# Patient Record
Sex: FEMALE | Race: ASIAN | URBAN - METROPOLITAN AREA
[De-identification: names, ages, dates, MRNs, and addresses within clinical notes are randomized per-mention and may not be internally consistent; named-entity substitution may affect disease eponyms.]

---

## 2022-10-17 ENCOUNTER — OFFICE VISIT (OUTPATIENT)
Dept: FAMILY MEDICINE CLINIC | Age: 47
End: 2022-10-17
Payer: COMMERCIAL

## 2022-10-17 VITALS
SYSTOLIC BLOOD PRESSURE: 135 MMHG | BODY MASS INDEX: 27.08 KG/M2 | DIASTOLIC BLOOD PRESSURE: 81 MMHG | OXYGEN SATURATION: 100 % | RESPIRATION RATE: 20 BRPM | HEIGHT: 61 IN | HEART RATE: 94 BPM | WEIGHT: 143.4 LBS

## 2022-10-17 DIAGNOSIS — E78.00 HYPERCHOLESTEROLEMIA: ICD-10-CM

## 2022-10-17 DIAGNOSIS — Z00.00 WELL WOMAN EXAM (NO GYNECOLOGICAL EXAM): Primary | ICD-10-CM

## 2022-10-17 DIAGNOSIS — E11.8 TYPE II DIABETES MELLITUS WITH COMPLICATION (HCC): ICD-10-CM

## 2022-10-17 DIAGNOSIS — I10 PRIMARY HYPERTENSION: ICD-10-CM

## 2022-10-17 DIAGNOSIS — Z23 ENCOUNTER FOR IMMUNIZATION: ICD-10-CM

## 2022-10-17 DIAGNOSIS — Z12.31 ENCOUNTER FOR SCREENING MAMMOGRAM FOR BREAST CANCER: ICD-10-CM

## 2022-10-17 PROBLEM — E11.9 TYPE 2 DIABETES MELLITUS (HCC): Status: ACTIVE | Noted: 2020-01-02

## 2022-10-17 LAB
ALBUMIN SERPL-MCNC: 4.2 G/DL (ref 3.5–5)
ALBUMIN/GLOB SERPL: 1.1 {RATIO} (ref 1.1–2.2)
ALP SERPL-CCNC: 98 U/L (ref 45–117)
ALT SERPL-CCNC: 41 U/L (ref 12–78)
ANION GAP SERPL CALC-SCNC: 7 MMOL/L (ref 5–15)
APPEARANCE UR: CLEAR
AST SERPL-CCNC: 21 U/L (ref 15–37)
BILIRUB SERPL-MCNC: 0.5 MG/DL (ref 0.2–1)
BILIRUB UR QL: NEGATIVE
BUN SERPL-MCNC: 6 MG/DL (ref 6–20)
BUN/CREAT SERPL: 9 (ref 12–20)
CALCIUM SERPL-MCNC: 9.5 MG/DL (ref 8.5–10.1)
CHLORIDE SERPL-SCNC: 107 MMOL/L (ref 97–108)
CHOLEST SERPL-MCNC: 162 MG/DL
CO2 SERPL-SCNC: 27 MMOL/L (ref 21–32)
COLOR UR: ABNORMAL
CREAT SERPL-MCNC: 0.7 MG/DL (ref 0.55–1.02)
CREAT UR-MCNC: 14.3 MG/DL
ERYTHROCYTE [DISTWIDTH] IN BLOOD BY AUTOMATED COUNT: 15 % (ref 11.5–14.5)
EST. AVERAGE GLUCOSE BLD GHB EST-MCNC: 192 MG/DL
FERRITIN SERPL-MCNC: 5 NG/ML (ref 26–388)
GLOBULIN SER CALC-MCNC: 4 G/DL (ref 2–4)
GLUCOSE SERPL-MCNC: 191 MG/DL (ref 65–100)
GLUCOSE UR STRIP.AUTO-MCNC: 100 MG/DL
HBA1C MFR BLD: 8.3 % (ref 4–5.6)
HCT VFR BLD AUTO: 41.1 % (ref 35–47)
HDLC SERPL-MCNC: 53 MG/DL
HDLC SERPL: 3.1 {RATIO} (ref 0–5)
HGB BLD-MCNC: 12.6 G/DL (ref 11.5–16)
HGB UR QL STRIP: NEGATIVE
KETONES UR QL STRIP.AUTO: NEGATIVE MG/DL
LDLC SERPL CALC-MCNC: 88.2 MG/DL (ref 0–100)
LEUKOCYTE ESTERASE UR QL STRIP.AUTO: NEGATIVE
MCH RBC QN AUTO: 26.3 PG (ref 26–34)
MCHC RBC AUTO-ENTMCNC: 30.7 G/DL (ref 30–36.5)
MCV RBC AUTO: 85.8 FL (ref 80–99)
MICROALBUMIN UR-MCNC: <0.5 MG/DL
MICROALBUMIN/CREAT UR-RTO: <35 MG/G (ref 0–30)
NITRITE UR QL STRIP.AUTO: NEGATIVE
NRBC # BLD: 0 K/UL (ref 0–0.01)
NRBC BLD-RTO: 0 PER 100 WBC
PH UR STRIP: 6.5 [PH] (ref 5–8)
PLATELET # BLD AUTO: 455 K/UL (ref 150–400)
PMV BLD AUTO: 10.4 FL (ref 8.9–12.9)
POTASSIUM SERPL-SCNC: 4 MMOL/L (ref 3.5–5.1)
PROT SERPL-MCNC: 8.2 G/DL (ref 6.4–8.2)
PROT UR STRIP-MCNC: NEGATIVE MG/DL
RBC # BLD AUTO: 4.79 M/UL (ref 3.8–5.2)
SODIUM SERPL-SCNC: 141 MMOL/L (ref 136–145)
SP GR UR REFRACTOMETRY: 1 (ref 1–1.03)
T4 FREE SERPL-MCNC: 1.5 NG/DL (ref 0.8–1.5)
TRIGL SERPL-MCNC: 104 MG/DL (ref ?–150)
TSH SERPL DL<=0.05 MIU/L-ACNC: 1.29 UIU/ML (ref 0.36–3.74)
UROBILINOGEN UR QL STRIP.AUTO: 0.2 EU/DL (ref 0.2–1)
VLDLC SERPL CALC-MCNC: 20.8 MG/DL
WBC # BLD AUTO: 7.4 K/UL (ref 3.6–11)

## 2022-10-17 PROCEDURE — 90471 IMMUNIZATION ADMIN: CPT | Performed by: FAMILY MEDICINE

## 2022-10-17 PROCEDURE — 99386 PREV VISIT NEW AGE 40-64: CPT | Performed by: FAMILY MEDICINE

## 2022-10-17 PROCEDURE — 90686 IIV4 VACC NO PRSV 0.5 ML IM: CPT | Performed by: FAMILY MEDICINE

## 2022-10-17 RX ORDER — ATORVASTATIN CALCIUM 10 MG/1
10 TABLET, FILM COATED ORAL DAILY
COMMUNITY

## 2022-10-17 RX ORDER — AMLODIPINE AND BENAZEPRIL HYDROCHLORIDE 5; 40 MG/1; MG/1
1 CAPSULE ORAL DAILY
COMMUNITY

## 2022-10-17 RX ORDER — METFORMIN HYDROCHLORIDE 1000 MG/1
TABLET, FILM COATED, EXTENDED RELEASE ORAL
COMMUNITY

## 2022-10-17 NOTE — LETTER
10/23/2022    Ms. Kaveh Gomez  [address] [id number]      Dear Kaveh Gomez:    Please find your most recent results below.         Recent Results (from the past 672 hour(s))   MICROALBUMIN, UR, RAND W/ MICROALB/CREAT RATIO    Collection Time: 10/17/22  9:03 AM   Result Value Ref Range    Microalbumin,urine random <0.50 MG/DL    Creatinine, urine random 14.30 mg/dL    Microalbumin/Creat ratio (mg/g creat) <35 (H) 0 - 30 mg/g   HEMOGLOBIN A1C WITH EAG    Collection Time: 10/17/22  9:03 AM   Result Value Ref Range    Hemoglobin A1c 8.3 (H) 4.0 - 5.6 %    Est. average glucose 192 mg/dL   FERRITIN    Collection Time: 10/17/22  9:03 AM   Result Value Ref Range    Ferritin 5 (L) 26 - 388 NG/ML   T4, FREE    Collection Time: 10/17/22  9:03 AM   Result Value Ref Range    T4, Free 1.5 0.8 - 1.5 NG/DL   TSH 3RD GENERATION    Collection Time: 10/17/22  9:03 AM   Result Value Ref Range    TSH 1.29 0.36 - 3.74 uIU/mL   URINALYSIS W/ RFLX MICROSCOPIC    Collection Time: 10/17/22  9:03 AM   Result Value Ref Range    Color YELLOW/STRAW      Appearance CLEAR CLEAR      Specific gravity 1.005 1.003 - 1.030      pH (UA) 6.5 5.0 - 8.0      Protein Negative Negative mg/dL    Glucose 100 (A) Negative mg/dL    Ketone Negative Negative mg/dL    Bilirubin Negative Negative      Blood Negative Negative      Urobilinogen 0.2 0.2 - 1.0 EU/dL    Nitrites Negative Negative      Leukocyte Esterase Negative Negative     METABOLIC PANEL, COMPREHENSIVE    Collection Time: 10/17/22  9:03 AM   Result Value Ref Range    Sodium 141 136 - 145 mmol/L    Potassium 4.0 3.5 - 5.1 mmol/L    Chloride 107 97 - 108 mmol/L    CO2 27 21 - 32 mmol/L    Anion gap 7 5 - 15 mmol/L    Glucose 191 (H) 65 - 100 mg/dL    BUN 6 6 - 20 MG/DL    Creatinine 0.70 0.55 - 1.02 MG/DL    BUN/Creatinine ratio 9 (L) 12 - 20      eGFR >60 >60 ml/min/1.73m2    Calcium 9.5 8.5 - 10.1 MG/DL    Bilirubin, total 0.5 0.2 - 1.0 MG/DL    ALT (SGPT) 41 12 - 78 U/L AST (SGOT) 21 15 - 37 U/L    Alk. phosphatase 98 45 - 117 U/L    Protein, total 8.2 6.4 - 8.2 g/dL    Albumin 4.2 3.5 - 5.0 g/dL    Globulin 4.0 2.0 - 4.0 g/dL    A-G Ratio 1.1 1.1 - 2.2     LIPID PANEL    Collection Time: 10/17/22  9:03 AM   Result Value Ref Range    Cholesterol, total 162 <200 MG/DL    Triglyceride 104 <150 MG/DL    HDL Cholesterol 53 MG/DL    LDL, calculated 88.2 0 - 100 MG/DL    VLDL, calculated 20.8 MG/DL    CHOL/HDL Ratio 3.1 0.0 - 5.0     CBC W/O DIFF    Collection Time: 10/17/22  9:03 AM   Result Value Ref Range    WBC 7.4 3.6 - 11.0 K/uL    RBC 4.79 3.80 - 5.20 M/uL    HGB 12.6 11.5 - 16.0 g/dL    HCT 41.1 35.0 - 47.0 %    MCV 85.8 80.0 - 99.0 FL    MCH 26.3 26.0 - 34.0 PG    MCHC 30.7 30.0 - 36.5 g/dL    RDW 15.0 (H) 11.5 - 14.5 %    PLATELET 487 (H) 965 - 400 K/uL    MPV 10.4 8.9 - 12.9 FL    NRBC 0.0 0  WBC    ABSOLUTE NRBC 0.00 0.00 - 0.01 K/uL   RECOMMENDATIONS:  1. Normal test results except for low blood count  Please have over the counter iron tablets 2 to 3 times daily,  an iron rich diet, vitamin c rich diet and vitamin-C 500mg tablet once daily, stool softner to prevent constipation from the iron therapy, be compliant for at least 3 months, will repeat the test in 3 months  2. Normal test results except for sugar level into the UNcontrolled diabetic state please be compliant with the following steps for improving diabetic care and outcome for further care to prevent further devastating complications of a uncontrolled diabetic state: increase Diabetic Education by more readings, have a great diabetic diet , low cholesterol diet, weight control and daily exercise 20- 30 min most days of the week, home glucose monitoring if possible, and daily foot care and yearly foot  Doctor  and  annual eye examinations at Ophthalmologist,  will cont with your average sugar reading check every 3months. Keep up the good work! Work on diet and exercise.   Continue with current  diet and medications.   Please call me if you have any questions: [phone]    Sincerely,      Say Cary MD

## 2022-10-17 NOTE — PROGRESS NOTES
Chief Complaint   Patient presents with    Establish Care     After obtaining consent, and per orders of Dr. Ariadna Jackson, injection of Influenza given by Conrad Maxwell. Patient instructed to remain in clinic for 20 minutes afterwards, and to report any adverse reaction to me immediately. 1. \"Have you been to the ER, urgent care clinic since your last visit? Hospitalized since your last visit? \" No    2. \"Have you seen or consulted any other health care providers outside of the [address] [place] [address] since your last visit? \" No     3. For patients aged 39-70: Has the patient had a colonoscopy / FIT/ Cologuard? NA - based on age      If the patient is female:    4. For patients aged 41-77: Has the patient had a mammogram within the past 2 years? No      5. For patients aged 21-65: Has the patient had a pap smear?  No    Health Maintenance Due   Topic Date Due    Hepatitis C Screening  Never done    Depression Screen  Never done    COVID-19 Vaccine (1) Never done    DTaP/Tdap/Td series (1 - Tdap) Never done    Cervical cancer screen  Never done    Colorectal Cancer Screening Combo  Never done    Flu Vaccine (1) 08/01/2022

## 2022-10-17 NOTE — PROGRESS NOTES
Subjective:   49 y.o. female for Well Woman Check, with history of type II diabetic state hypercholesterolemia hypertension present as a new patient for annual wellness visit stating that Her gyne and breast care is done elsewhere by her Ob-Gyne physician. last Complete Physical exam was 2021, not Up todate w/ all vaccination,  Required Immunizations offered but opted in 2021, patient currently taking metformin XR 1000 mg twice daily her last documented hemoglobin A1c was in 2019 October of 7.5 and seen ophthalmologist in 2021, she is a non-smoker not on alcohol usually, she is also taking Lotrel 5/40 milligrams once daily atorvastatin 10 mg once daily, and she does home glucose monitoring,    last mammog was never,   last pap smear normal and was in 2022,       last colonoscopy was never but had the cologaurd stool test with neg results in 2021 ,   ++ past surgical hx,  last bone dexa scan was nl 2017,   No family hx of breast cancer       no family hx of colon cancer, , +++sexaully active and uses Safe sex, +++ physically active,  compliant w/ meds, no Rf needed for today for meds. Patient has good supportive care at home, and feels safe no physical mental abuse,  and no kids,      No falll, not feeling depressed not anxious normal interests to do things, is physically functional  and walks w/out mobility device and,mentaly is functional,  very Alert and oriented ,    BMI r/w'd bp was screened for abnormality,        Not on File  History reviewed. No pertinent past medical history. History reviewed. No pertinent surgical history. No family history on file. Social History     Tobacco Use    Smoking status: Not on file    Smokeless tobacco: Not on file   Substance Use Topics    Alcohol use: Not on file            ROS: Feeling generally well. No TIA's or unusual headaches, no dysphagia. No prolonged cough. No dyspnea or chest pain on exertion.   No abdominal pain, change in bowel habits, black or bloody stools. No urinary tract symptoms. No new or unusual musculoskeletal symptoms. Specific concerns today: htn, chol and dm,   Has had ey visit and foot doc last yr  PLATELET 623 - 881 K/uL 455 High       Hemoglobin A1c 4.0 - 5.6 % 8.3 High       Ferritin 26 - 388 NG/ML 5 Low       Objective: The patient appears well, alert, oriented x 3, in no distress. Visit Vitals  /81 (BP 1 Location: Left upper arm, BP Patient Position: Sitting, BP Cuff Size: Adult)   Pulse 94   Resp 20   Ht 5' 1\" (1.549 m)   Wt 143 lb 6.4 oz (65 kg)   SpO2 100%   BMI 27.10 kg/m²     ENT normal.  Neck supple. No adenopathy or thyromegaly. EDUAR. Lungs are clear, good air entry, no wheezes, rhonchi or rales. S1 and S2 normal, no murmurs, regular rate and rhythm. Abdomen soft without tenderness, guarding, mass or organomegaly. Extremities show no edema, normal peripheral pulses. Neurological is normal, no focal findings. Breast and Pelvic exams are deferred. Assessment/Plan:   Well Woman      ICD-10-CM ICD-9-CM    1. Well woman exam (no gynecological exam)  Z00.00 V70.0     [V70.0]      2. Encounter for immunization  Z23 V03.89 INFLUENZA, FLUARIX, FLULAVAL, FLUZONE (AGE 6 MO+), AFLURIA(AGE 3Y+) IM, PF, 0.5 ML      3. Primary hypertension  I10 401.9 metFORMIN (GLUMETZA) 1,000 mg TG24 24 hour tablet      atorvastatin (LIPITOR) 10 mg tablet      amLODIPine-benazepril (LOTREL) 5-40 mg per capsule      CBC W/O DIFF      LIPID PANEL      METABOLIC PANEL, COMPREHENSIVE      URINALYSIS W/ RFLX MICROSCOPIC      TSH 3RD GENERATION      T4, FREE      FERRITIN      HEMOGLOBIN A1C WITH EAG      MICROALBUMIN, UR, RAND W/ MICROALB/CREAT RATIO      MICROALBUMIN, UR, RAND W/ MICROALB/CREAT RATIO      HEMOGLOBIN A1C WITH EAG      FERRITIN      T4, FREE      TSH 3RD GENERATION      URINALYSIS W/ RFLX MICROSCOPIC      METABOLIC PANEL, COMPREHENSIVE      LIPID PANEL      CBC W/O DIFF      4.  Type II diabetes mellitus with complication (HCC)  F43.7 250.90 metFORMIN (GLUMETZA) 1,000 mg TG24 24 hour tablet      atorvastatin (LIPITOR) 10 mg tablet      amLODIPine-benazepril (LOTREL) 5-40 mg per capsule      REFERRAL TO PODIATRY      REFERRAL TO OPHTHALMOLOGY      CBC W/O DIFF      LIPID PANEL      METABOLIC PANEL, COMPREHENSIVE      URINALYSIS W/ RFLX MICROSCOPIC      TSH 3RD GENERATION      T4, FREE      FERRITIN      HEMOGLOBIN A1C WITH EAG      MICROALBUMIN, UR, RAND W/ MICROALB/CREAT RATIO      MICROALBUMIN, UR, RAND W/ MICROALB/CREAT RATIO      HEMOGLOBIN A1C WITH EAG      FERRITIN      T4, FREE      TSH 3RD GENERATION      URINALYSIS W/ RFLX MICROSCOPIC      METABOLIC PANEL, COMPREHENSIVE      LIPID PANEL      CBC W/O DIFF      5. Encounter for screening mammogram for breast cancer  Z12.31 V76.12 [facility] MAMMO BI SCREENING INCL CAD      6.  Hypercholesterolemia  E78.00 272.0 CBC W/O DIFF      LIPID PANEL      METABOLIC PANEL, COMPREHENSIVE      URINALYSIS W/ RFLX MICROSCOPIC      TSH 3RD GENERATION      T4, FREE      FERRITIN      HEMOGLOBIN A1C WITH EAG      HEMOGLOBIN A1C WITH EAG      FERRITIN      T4, FREE      TSH 3RD GENERATION      URINALYSIS W/ RFLX MICROSCOPIC      METABOLIC PANEL, COMPREHENSIVE      LIPID PANEL      CBC W/O DIFF      lose weight, increase physical activity, limit alcohol consumption, follow low fat diet, follow low salt diet  reviewed diet, exercise and weight control

## 2022-10-23 RX ORDER — FERROUS SULFATE 325(65) MG
325 TABLET, DELAYED RELEASE (ENTERIC COATED) ORAL
Qty: 90 TABLET | Refills: 3 | Status: SHIPPED | OUTPATIENT
Start: 2022-10-23

## 2023-02-21 DIAGNOSIS — E11.9 TYPE 2 DIABETES MELLITUS WITHOUT COMPLICATIONS (HCC): ICD-10-CM

## 2023-02-22 RX ORDER — METFORMIN HYDROCHLORIDE 500 MG/1
TABLET, EXTENDED RELEASE ORAL
Qty: 360 TABLET | Refills: 3 | Status: SHIPPED | OUTPATIENT
Start: 2023-02-22

## 2023-03-02 RX ORDER — LANOLIN ALCOHOL/MO/W.PET/CERES
CREAM (GRAM) TOPICAL
Qty: 90 TABLET | Refills: 3 | Status: SHIPPED | OUTPATIENT
Start: 2023-03-02

## 2023-05-23 ENCOUNTER — OFFICE VISIT (OUTPATIENT)
Age: 48
End: 2023-05-23
Payer: COMMERCIAL

## 2023-05-23 VITALS
DIASTOLIC BLOOD PRESSURE: 85 MMHG | SYSTOLIC BLOOD PRESSURE: 132 MMHG | HEART RATE: 88 BPM | RESPIRATION RATE: 16 BRPM | OXYGEN SATURATION: 97 % | TEMPERATURE: 98.1 F | WEIGHT: 142 LBS | BODY MASS INDEX: 26.83 KG/M2

## 2023-05-23 DIAGNOSIS — E11.8 TYPE 2 DIABETES MELLITUS WITH UNSPECIFIED COMPLICATIONS (HCC): Primary | ICD-10-CM

## 2023-05-23 DIAGNOSIS — I10 PRIMARY HYPERTENSION: ICD-10-CM

## 2023-05-23 DIAGNOSIS — E78.00 PURE HYPERCHOLESTEROLEMIA, UNSPECIFIED: ICD-10-CM

## 2023-05-23 DIAGNOSIS — E61.1 IRON DEFICIENCY: ICD-10-CM

## 2023-05-23 PROCEDURE — 3074F SYST BP LT 130 MM HG: CPT | Performed by: FAMILY MEDICINE

## 2023-05-23 PROCEDURE — 99213 OFFICE O/P EST LOW 20 MIN: CPT | Performed by: FAMILY MEDICINE

## 2023-05-23 PROCEDURE — 3078F DIAST BP <80 MM HG: CPT | Performed by: FAMILY MEDICINE

## 2023-05-23 PROCEDURE — 3052F HG A1C>EQUAL 8.0%<EQUAL 9.0%: CPT | Performed by: FAMILY MEDICINE

## 2023-05-23 ASSESSMENT — PATIENT HEALTH QUESTIONNAIRE - PHQ9
SUM OF ALL RESPONSES TO PHQ QUESTIONS 1-9: 0
SUM OF ALL RESPONSES TO PHQ QUESTIONS 1-9: 0
SUM OF ALL RESPONSES TO PHQ9 QUESTIONS 1 & 2: 0
2. FEELING DOWN, DEPRESSED OR HOPELESS: 0
SUM OF ALL RESPONSES TO PHQ QUESTIONS 1-9: 0
1. LITTLE INTEREST OR PLEASURE IN DOING THINGS: 0
SUM OF ALL RESPONSES TO PHQ QUESTIONS 1-9: 0

## 2023-05-23 NOTE — PROGRESS NOTES
Chief Complaint   Patient presents with    3 Month Follow-Up     Vitals:    23 1018   BP: 132/85   Pulse: 88   Resp: 16   Temp: 98.1 °F (36.7 °C)   TempSrc: Oral   SpO2: 97%   Weight: 142 lb (64.4 kg)      1. Have you been to the ER, urgent care clinic since your last visit? Hospitalized since your last visit? No    2. Have you seen or consulted any other health care providers outside of the [address] [place] [address] since your last visit? Include any pap smears or colon screening. No    The patientShaila Read, identity was verified by  Name and .

## 2023-05-23 NOTE — PROGRESS NOTES
Luan Doran (:  1975) is a 49 y.o. female,Established patient, here for evaluation of the following chief complaint(s):  3 Month Follow-Up    who present for f/u regarding the diabetic state, ferritin and bp check,  patient has been compliant with diabetic meds since last visit and is trying to have a diabetic diet, no Rf needed for today, patient currently denies tingling sensation, has no polyurea and polydipsia, last a1c was not at target,     Constitutional: no chills and fever,  , nad     HENT: no ear pain or nosebleeds. No blurred vision    Respiratory: no shortness of breath, wheezing cough     Cardiovascular: Has no chest pain, ,and racing heart . Gastrointestinal: No constipation, diarrhea, nausea and vomiting. Genitourinary: No frequency. Musculoskeletal: Negative for joint pain. Skin: no itching, no rash. Neurological: Negative for dizziness, no tremors  Psychiatric/Behavioral: Negative for depression   is not nervous/anxious. General:  alert cooperative appears stated for the age  [de-identified]; normocephalic and atraumatic PERRLA extraocular motor intact normal tympanic membrane normal external ear bilaterally, mucosal normal no drainage  Neck: supple no adenopathy no JVD no bruit  Lungs: Clear to auscultation bilaterally no rales rhonchi or wheezes  Heart: Normal regular S1-S2 ,  no murmur  Breast exam deferred  Abdomen: Soft nontender normal bowel sounds   Extremities: Normal range of motion no point tenderness normal pulses no edema  Pelvic/: No lesion, no lymphadenopathy  Skin: Normal no lesion no rash         ASSESSMENT/PLAN:  1. Type 2 diabetes mellitus with unspecified complications (HCC)  -     Comprehensive Metabolic Panel; Future  -     CBC; Future  -     Ferritin; Future  -     Hemoglobin A1C; Future  -     sitaGLIPtan-metFORMIN (JANUMET)  MG per tablet; Take 1 tablet by mouth 2 times daily (with meals), Disp-60 tablet, R-5Normal  2.  Primary

## 2023-05-24 LAB
ALBUMIN SERPL-MCNC: 4.1 G/DL (ref 3.5–5)
ALBUMIN/GLOB SERPL: 1.2 (ref 1.1–2.2)
ALP SERPL-CCNC: 98 U/L (ref 45–117)
ALT SERPL-CCNC: 36 U/L (ref 12–78)
ANION GAP SERPL CALC-SCNC: 6 MMOL/L (ref 5–15)
AST SERPL-CCNC: 15 U/L (ref 15–37)
BILIRUB SERPL-MCNC: 0.5 MG/DL (ref 0.2–1)
BUN SERPL-MCNC: 6 MG/DL (ref 6–20)
BUN/CREAT SERPL: 8 (ref 12–20)
CALCIUM SERPL-MCNC: 9.5 MG/DL (ref 8.5–10.1)
CHLORIDE SERPL-SCNC: 107 MMOL/L (ref 97–108)
CO2 SERPL-SCNC: 25 MMOL/L (ref 21–32)
CREAT SERPL-MCNC: 0.76 MG/DL (ref 0.55–1.02)
ERYTHROCYTE [DISTWIDTH] IN BLOOD BY AUTOMATED COUNT: 10.9 % (ref 11.5–14.5)
EST. AVERAGE GLUCOSE BLD GHB EST-MCNC: 192 MG/DL
FERRITIN SERPL-MCNC: 26 NG/ML (ref 26–388)
GLOBULIN SER CALC-MCNC: 3.4 G/DL (ref 2–4)
GLUCOSE SERPL-MCNC: 254 MG/DL (ref 65–100)
HBA1C MFR BLD: 8.3 % (ref 4–5.6)
HCT VFR BLD AUTO: 46.3 % (ref 35–47)
HGB BLD-MCNC: 15.8 G/DL (ref 11.5–16)
MCH RBC QN AUTO: 32.8 PG (ref 26–34)
MCHC RBC AUTO-ENTMCNC: 34.1 G/DL (ref 30–36.5)
MCV RBC AUTO: 96.3 FL (ref 80–99)
NRBC # BLD: 0 K/UL (ref 0–0.01)
NRBC BLD-RTO: 0 PER 100 WBC
PLATELET # BLD AUTO: 380 K/UL (ref 150–400)
PMV BLD AUTO: 10.5 FL (ref 8.9–12.9)
POTASSIUM SERPL-SCNC: 4.2 MMOL/L (ref 3.5–5.1)
PROT SERPL-MCNC: 7.5 G/DL (ref 6.4–8.2)
RBC # BLD AUTO: 4.81 M/UL (ref 3.8–5.2)
SODIUM SERPL-SCNC: 138 MMOL/L (ref 136–145)
WBC # BLD AUTO: 7.8 K/UL (ref 3.6–11)

## 2023-05-24 RX ORDER — SITAGLIPTIN AND METFORMIN HYDROCHLORIDE 1000; 50 MG/1; MG/1
1 TABLET, FILM COATED ORAL 2 TIMES DAILY WITH MEALS
Qty: 60 TABLET | Refills: 5 | Status: SHIPPED | OUTPATIENT
Start: 2023-05-24

## 2023-05-24 RX ORDER — FERROUS SULFATE 325(65) MG
325 TABLET ORAL
Qty: 90 TABLET | Refills: 1 | Status: SHIPPED | OUTPATIENT
Start: 2023-05-24

## 2024-03-11 DIAGNOSIS — E61.1 IRON DEFICIENCY: ICD-10-CM

## 2024-03-11 DIAGNOSIS — I10 ESSENTIAL (PRIMARY) HYPERTENSION: ICD-10-CM

## 2024-03-11 DIAGNOSIS — E11.69 TYPE 2 DIABETES MELLITUS WITH OTHER SPECIFIED COMPLICATION (HCC): ICD-10-CM

## 2024-03-11 RX ORDER — AMLODIPINE AND BENAZEPRIL HYDROCHLORIDE 5; 40 MG/1; MG/1
CAPSULE ORAL
Qty: 30 CAPSULE | Refills: 0 | Status: SHIPPED | OUTPATIENT
Start: 2024-03-11

## 2024-03-11 RX ORDER — ATORVASTATIN CALCIUM 10 MG/1
10 TABLET, FILM COATED ORAL DAILY
Qty: 30 TABLET | Refills: 0 | Status: SHIPPED | OUTPATIENT
Start: 2024-03-11

## 2024-03-11 RX ORDER — FERROUS SULFATE 325(65) MG
325 TABLET ORAL
Qty: 30 TABLET | Refills: 0 | Status: SHIPPED | OUTPATIENT
Start: 2024-03-11

## 2024-03-11 RX ORDER — METFORMIN HYDROCHLORIDE 500 MG/1
1000 TABLET, EXTENDED RELEASE ORAL 2 TIMES DAILY WITH MEALS
Qty: 120 TABLET | Refills: 0 | Status: SHIPPED | OUTPATIENT
Start: 2024-03-11

## 2024-03-11 NOTE — TELEPHONE ENCOUNTER
Patient requesting refills on Metformin 500 mg ER,atorvastatin, ferrous sulfate, Lotrel 5-40 mg sent to [facility]. Patient can be reached at [phone].

## 2024-04-04 ENCOUNTER — PATIENT MESSAGE (OUTPATIENT)
Age: 49
End: 2024-04-04

## 2024-04-04 DIAGNOSIS — I10 ESSENTIAL (PRIMARY) HYPERTENSION: ICD-10-CM

## 2024-04-04 DIAGNOSIS — E11.69 TYPE 2 DIABETES MELLITUS WITH OTHER SPECIFIED COMPLICATION (HCC): ICD-10-CM

## 2024-04-04 DIAGNOSIS — E61.1 IRON DEFICIENCY: ICD-10-CM

## 2024-04-04 RX ORDER — METFORMIN HYDROCHLORIDE 500 MG/1
TABLET, EXTENDED RELEASE ORAL
Qty: 120 TABLET | Refills: 0 | Status: SHIPPED | OUTPATIENT
Start: 2024-04-04

## 2024-04-04 RX ORDER — FERROUS SULFATE 325(65) MG
325 TABLET ORAL
Qty: 30 TABLET | Refills: 0 | OUTPATIENT
Start: 2024-04-04

## 2024-04-04 RX ORDER — METFORMIN HYDROCHLORIDE 500 MG/1
1000 TABLET, EXTENDED RELEASE ORAL 2 TIMES DAILY WITH MEALS
Qty: 120 TABLET | Refills: 0 | OUTPATIENT
Start: 2024-04-04

## 2024-04-04 RX ORDER — ATORVASTATIN CALCIUM 10 MG/1
10 TABLET, FILM COATED ORAL DAILY
Qty: 90 TABLET | Refills: 2 | Status: SHIPPED | OUTPATIENT
Start: 2024-04-04

## 2024-04-04 RX ORDER — ATORVASTATIN CALCIUM 10 MG/1
10 TABLET, FILM COATED ORAL DAILY
Qty: 30 TABLET | Refills: 0 | OUTPATIENT
Start: 2024-04-04

## 2024-04-04 RX ORDER — AMLODIPINE AND BENAZEPRIL HYDROCHLORIDE 5; 40 MG/1; MG/1
CAPSULE ORAL
Qty: 30 CAPSULE | Refills: 0 | OUTPATIENT
Start: 2024-04-04

## 2024-04-04 RX ORDER — AMLODIPINE AND BENAZEPRIL HYDROCHLORIDE 5; 40 MG/1; MG/1
CAPSULE ORAL
Qty: 90 CAPSULE | Refills: 2 | Status: SHIPPED | OUTPATIENT
Start: 2024-04-04

## 2024-04-04 NOTE — TELEPHONE ENCOUNTER
From: Martha Whiting  To: Dr. Hesham Harper  Sent: 4/4/2024 12:58 PM EDT  Subject: Prescriptions refills    Hi Dr. Harper,  I just noticed that Dr. Bazan only approved 30days refill for medication last month while you're on vacation. Hope my request for prescriptions be filled soon. Thank you and warm regards.    Martha   No

## 2024-05-09 DIAGNOSIS — E11.69 TYPE 2 DIABETES MELLITUS WITH OTHER SPECIFIED COMPLICATION (HCC): ICD-10-CM

## 2024-05-09 NOTE — TELEPHONE ENCOUNTER
Last appointment: 5/23/23  Next appointment: 7/5/24  Previous refill encounter(s): 4/4/24 #120    Requested Prescriptions     Pending Prescriptions Disp Refills    metFORMIN (GLUCOPHAGE-XR) 500 MG extended release tablet [Pharmacy Med Name: metFORMIN HCl  MG Oral Tablet Extended Release 24 Hour (Glucophage-XR)] 120 tablet 5     Sig: Take 2 tablets by mouth 2 times daily with meals.         For Pharmacy Admin Tracking Only    Program: Medication Refill  CPA in place:    Recommendation Provided To:   Intervention Detail: New Rx: 1, reason: Patient Preference  Intervention Accepted By:   Gap Closed?:    Time Spent (min): 5

## 2024-05-10 RX ORDER — METFORMIN HYDROCHLORIDE 500 MG/1
TABLET, EXTENDED RELEASE ORAL
Qty: 120 TABLET | Refills: 5 | Status: SHIPPED | OUTPATIENT
Start: 2024-05-10

## 2024-06-18 SDOH — ECONOMIC STABILITY: FOOD INSECURITY: WITHIN THE PAST 12 MONTHS, THE FOOD YOU BOUGHT JUST DIDN'T LAST AND YOU DIDN'T HAVE MONEY TO GET MORE.: SOMETIMES TRUE

## 2024-06-18 SDOH — ECONOMIC STABILITY: HOUSING INSECURITY
IN THE LAST 12 MONTHS, WAS THERE A TIME WHEN YOU DID NOT HAVE A STEADY PLACE TO SLEEP OR SLEPT IN A SHELTER (INCLUDING NOW)?: NO

## 2024-06-18 SDOH — ECONOMIC STABILITY: INCOME INSECURITY: HOW HARD IS IT FOR YOU TO PAY FOR THE VERY BASICS LIKE FOOD, HOUSING, MEDICAL CARE, AND HEATING?: SOMEWHAT HARD

## 2024-06-18 SDOH — ECONOMIC STABILITY: FOOD INSECURITY: WITHIN THE PAST 12 MONTHS, YOU WORRIED THAT YOUR FOOD WOULD RUN OUT BEFORE YOU GOT MONEY TO BUY MORE.: SOMETIMES TRUE

## 2024-06-18 SDOH — ECONOMIC STABILITY: TRANSPORTATION INSECURITY
IN THE PAST 12 MONTHS, HAS LACK OF TRANSPORTATION KEPT YOU FROM MEETINGS, WORK, OR FROM GETTING THINGS NEEDED FOR DAILY LIVING?: YES

## 2024-06-19 ENCOUNTER — OFFICE VISIT (OUTPATIENT)
Age: 49
End: 2024-06-19
Payer: COMMERCIAL

## 2024-06-19 VITALS
TEMPERATURE: 97.1 F | DIASTOLIC BLOOD PRESSURE: 82 MMHG | RESPIRATION RATE: 18 BRPM | OXYGEN SATURATION: 98 % | BODY MASS INDEX: 25.11 KG/M2 | WEIGHT: 133 LBS | HEART RATE: 78 BPM | HEIGHT: 61 IN | SYSTOLIC BLOOD PRESSURE: 126 MMHG

## 2024-06-19 DIAGNOSIS — I10 PRIMARY HYPERTENSION: ICD-10-CM

## 2024-06-19 DIAGNOSIS — N95.1 POST MENOPAUSAL SYNDROME: ICD-10-CM

## 2024-06-19 DIAGNOSIS — Z00.00 ENCOUNTER FOR WELL ADULT EXAM WITHOUT ABNORMAL FINDINGS: Primary | ICD-10-CM

## 2024-06-19 DIAGNOSIS — Z12.31 ENCOUNTER FOR SCREENING MAMMOGRAM FOR BREAST CANCER: ICD-10-CM

## 2024-06-19 DIAGNOSIS — E11.69 TYPE 2 DIABETES MELLITUS WITH OTHER SPECIFIED COMPLICATION, WITHOUT LONG-TERM CURRENT USE OF INSULIN (HCC): ICD-10-CM

## 2024-06-19 DIAGNOSIS — Z12.11 SCREENING FOR MALIGNANT NEOPLASM OF COLON: ICD-10-CM

## 2024-06-19 LAB
APPEARANCE UR: CLEAR
BILIRUB UR QL: NEGATIVE
COLOR UR: ABNORMAL
CREAT UR-MCNC: 26.9 MG/DL
GLUCOSE UR STRIP.AUTO-MCNC: 500 MG/DL
HGB UR QL STRIP: NEGATIVE
KETONES UR QL STRIP.AUTO: NEGATIVE MG/DL
LEUKOCYTE ESTERASE UR QL STRIP.AUTO: NEGATIVE
MICROALBUMIN UR-MCNC: <0.5 MG/DL
MICROALBUMIN/CREAT UR-RTO: <19 MG/G (ref 0–30)
NITRITE UR QL STRIP.AUTO: NEGATIVE
PH UR STRIP: 7 (ref 5–8)
PROT UR STRIP-MCNC: NEGATIVE MG/DL
SP GR UR REFRACTOMETRY: 1.01 (ref 1–1.03)
SPECIMEN HOLD: NORMAL
UROBILINOGEN UR QL STRIP.AUTO: 0.2 EU/DL (ref 0.2–1)

## 2024-06-19 PROCEDURE — 3074F SYST BP LT 130 MM HG: CPT | Performed by: FAMILY MEDICINE

## 2024-06-19 PROCEDURE — 3079F DIAST BP 80-89 MM HG: CPT | Performed by: FAMILY MEDICINE

## 2024-06-19 PROCEDURE — 99396 PREV VISIT EST AGE 40-64: CPT | Performed by: FAMILY MEDICINE

## 2024-06-19 SDOH — ECONOMIC STABILITY: HOUSING INSECURITY
IN THE LAST 12 MONTHS, WAS THERE A TIME WHEN YOU DID NOT HAVE A STEADY PLACE TO SLEEP OR SLEPT IN A SHELTER (INCLUDING NOW)?: PATIENT DECLINED

## 2024-06-19 SDOH — ECONOMIC STABILITY: FOOD INSECURITY: WITHIN THE PAST 12 MONTHS, YOU WORRIED THAT YOUR FOOD WOULD RUN OUT BEFORE YOU GOT MONEY TO BUY MORE.: PATIENT DECLINED

## 2024-06-19 SDOH — ECONOMIC STABILITY: INCOME INSECURITY: HOW HARD IS IT FOR YOU TO PAY FOR THE VERY BASICS LIKE FOOD, HOUSING, MEDICAL CARE, AND HEATING?: PATIENT DECLINED

## 2024-06-19 ASSESSMENT — PATIENT HEALTH QUESTIONNAIRE - PHQ9
SUM OF ALL RESPONSES TO PHQ QUESTIONS 1-9: 0
SUM OF ALL RESPONSES TO PHQ QUESTIONS 1-9: 0
1. LITTLE INTEREST OR PLEASURE IN DOING THINGS: NOT AT ALL
SUM OF ALL RESPONSES TO PHQ QUESTIONS 1-9: 0
SUM OF ALL RESPONSES TO PHQ9 QUESTIONS 1 & 2: 0
2. FEELING DOWN, DEPRESSED OR HOPELESS: NOT AT ALL
SUM OF ALL RESPONSES TO PHQ QUESTIONS 1-9: 0

## 2024-06-19 NOTE — PROGRESS NOTES
Well Adult Note  Name: Martha Whiting Today’s Date: 2024   MRN: [id number] Sex: Female   Age: 48 y.o. Ethnicity: Non- / Non    : 1975 Race: Other       Martha Whiting is here for well adult exam.         Review of Systems    Constitutional: no fever, nl  energy levels, nl sleep patterns, nl appetite, and nl weight fluctuations,  - exercise habits,  nad     HENT: no ear pain or nosebleeds. No blurred vision  Respiratory: no shortness of breath, wheezing cough   Cardiovascular: Has no chest pain, ,and racing heart .   Gastrointestinal: No constipation, diarrhea, nausea and vomiting.   Genitourinary: No frequency.   Musculoskeletal: Negative for joint pain.   Skin: no itching, no rash.   Neurological: Negative for dizziness, no tremors  Psychiatric/Behavioral: no for depression  no nervous/anxious, nl stress levels, and overall emotional well-being .      Allergies   Allergen Reactions    Other Itching     Skin irritation with ECG electrodes for stress test - use hypoallergenic/pediatric electrodes    Penicillins Other (See Comments)     Patient was told when she was young that she was allergic           Prior to Visit Medications    Medication Sig Taking? Authorizing Provider   metFORMIN (GLUCOPHAGE-XR) 500 MG extended release tablet Take 2 tablets by mouth 2 times daily with meals. Yes Hesham Harper MD   amLODIPine-benazepril (LOTREL) 5-40 MG per capsule Take 1 capsule by mouth daily. Yes Hesham Harper MD   atorvastatin (LIPITOR) 10 MG tablet Take 1 tablet by mouth daily Yes Hesham Harper MD   ferrous sulfate (IRON 325) 325 (65 Fe) MG tablet Take 1 tablet by mouth daily (with breakfast) Yes Gerardo Bazan MD   sitaGLIPtan-metFORMIN (JANUMET)  MG per tablet Take 1 tablet by mouth 2 times daily (with meals) Yes Hesham Harper MD   blood glucose test strips (ASCENSIA AUTODISC VI;ONE TOUCH ULTRA TEST VI) strip ONE TOUCH ULTRA 2.  Check blood sugar

## 2024-06-19 NOTE — PROGRESS NOTES
Chief Complaint   Patient presents with    Annual Exam       \"Have you been to the ER, urgent care clinic since your last visit?  Hospitalized since your last visit?\"    NO    “Have you seen or consulted any other health care providers outside of [facility] since your last visit?”    NO    “Have you had a colorectal cancer screening such as a colonoscopy/FIT/Cologuard?    NO    No colonoscopy on file  No cologuard on file  No FIT/FOBT on file   No flexible sigmoidoscopy on file        Have you had a mammogram?”   NO    No breast cancer screening on file      “Have you had a pap smear?”    NO    No cervical cancer screening on file          Vitals:    24 0817   BP: 126/82   Pulse: 78   Resp: 18   Temp: 97.1 °F (36.2 °C)   TempSrc: Infrared   SpO2: 98%   Weight: 60.3 kg (133 lb)   Height: 1.549 m (5' 1\")        Health Maintenance Due   Topic Date Due    Diabetic foot exam  Never done    Diabetic retinal exam  Never done    Cervical cancer screen  Never done    Breast cancer screen  Never done    Colorectal Cancer Screen  Never done    Diabetic Alb to Cr ratio (uACR) test  10/17/2023    Lipids  10/17/2023    A1C test (Diabetic or Prediabetic)  2024    GFR test (Diabetes, CKD 3-4, OR last GFR 15-59)  2024        The patient, Martha OwensChristopherWilder, identity was verified by name and

## 2024-06-20 LAB
CHOLEST SERPL-MCNC: 172 MG/DL
ERYTHROCYTE [DISTWIDTH] IN BLOOD BY AUTOMATED COUNT: 11.3 % (ref 11.5–14.5)
EST. AVERAGE GLUCOSE BLD GHB EST-MCNC: 212 MG/DL
HBA1C MFR BLD: 9 % (ref 4–5.6)
HCT VFR BLD AUTO: 44.4 % (ref 35–47)
HDLC SERPL-MCNC: 51 MG/DL
HDLC SERPL: 3.4 (ref 0–5)
HGB BLD-MCNC: 15.8 G/DL (ref 11.5–16)
LDLC SERPL CALC-MCNC: 89.6 MG/DL (ref 0–100)
MCH RBC QN AUTO: 33 PG (ref 26–34)
MCHC RBC AUTO-ENTMCNC: 35.6 G/DL (ref 30–36.5)
MCV RBC AUTO: 92.7 FL (ref 80–99)
NRBC # BLD: 0 K/UL (ref 0–0.01)
NRBC BLD-RTO: 0 PER 100 WBC
PLATELET # BLD AUTO: 370 K/UL (ref 150–400)
PMV BLD AUTO: 10.4 FL (ref 8.9–12.9)
RBC # BLD AUTO: 4.79 M/UL (ref 3.8–5.2)
TRIGL SERPL-MCNC: 157 MG/DL
TSH SERPL DL<=0.05 MIU/L-ACNC: <0.01 UIU/ML (ref 0.36–3.74)
VLDLC SERPL CALC-MCNC: 31.4 MG/DL
WBC # BLD AUTO: 6.1 K/UL (ref 3.6–11)

## 2024-06-21 RX ORDER — SITAGLIPTIN AND METFORMIN HYDROCHLORIDE 1000; 50 MG/1; MG/1
1 TABLET, FILM COATED ORAL 2 TIMES DAILY WITH MEALS
Qty: 60 TABLET | Refills: 5 | Status: SHIPPED | OUTPATIENT
Start: 2024-06-21

## 2024-07-05 ENCOUNTER — OFFICE VISIT (OUTPATIENT)
Age: 49
End: 2024-07-05
Payer: COMMERCIAL

## 2024-07-05 VITALS
OXYGEN SATURATION: 97 % | SYSTOLIC BLOOD PRESSURE: 128 MMHG | RESPIRATION RATE: 17 BRPM | TEMPERATURE: 97.7 F | DIASTOLIC BLOOD PRESSURE: 84 MMHG | BODY MASS INDEX: 25.3 KG/M2 | HEIGHT: 61 IN | WEIGHT: 134 LBS | HEART RATE: 89 BPM

## 2024-07-05 DIAGNOSIS — E03.8 TSH DEFICIENCY: Primary | ICD-10-CM

## 2024-07-05 DIAGNOSIS — E11.69 TYPE 2 DIABETES MELLITUS WITH OTHER SPECIFIED COMPLICATION, WITHOUT LONG-TERM CURRENT USE OF INSULIN (HCC): ICD-10-CM

## 2024-07-05 PROCEDURE — 3074F SYST BP LT 130 MM HG: CPT | Performed by: FAMILY MEDICINE

## 2024-07-05 PROCEDURE — 3079F DIAST BP 80-89 MM HG: CPT | Performed by: FAMILY MEDICINE

## 2024-07-05 PROCEDURE — 99213 OFFICE O/P EST LOW 20 MIN: CPT | Performed by: FAMILY MEDICINE

## 2024-07-05 PROCEDURE — 3052F HG A1C>EQUAL 8.0%<EQUAL 9.0%: CPT | Performed by: FAMILY MEDICINE

## 2024-07-05 NOTE — PROGRESS NOTES
Chief Complaint   Patient presents with    Follow-up     2024 Results      \"Have you been to the ER, urgent care clinic since your last visit?  Hospitalized since your last visit?\"    NO    “Have you seen or consulted any other health care providers outside of [facility] since your last visit?”    NO        Vitals:    24 0816   BP: 128/84   Pulse: 89   Resp: 17   Temp: 97.7 °F (36.5 °C)   SpO2: 97%     Health Maintenance Due   Topic Date Due    Diabetic retinal exam  Never done    Cervical cancer screen  Never done    Breast cancer screen  Never done    Colorectal Cancer Screen  Never done    GFR test (Diabetes, CKD 3-4, OR last GFR 15-59)  2024      The patient, Martha OwensChristopherGabinofrankie, identity was verified by name and , pharmacy verified  Labs:No  Fasting:No         
ROM  Skin:   warm and dry. No diaphoresis, rashes, or lesions.   Neurological: Alert, awake,  oriented x3 ,  normal speech.       Assessment & Plan   1. TSH deficiency  -     TSH + Free T4 Panel; Future  -     Thyroid Antibodies; Future  -     T3 Total and Free ([facility] Default)  2. Type 2 diabetes mellitus with other specified complication, without long-term current use of insulin (HCC)  -     TSH + Free T4 Panel; Future  -     Thyroid Antibodies; Future  -     T3 Total and Free ([facility] Default)      Return in about 3 months (around 10/5/2024), or if symptoms worsen or fail to improve.           An electronic signature was used to authenticate this note.    --Hesham Harper MD

## 2024-07-10 ENCOUNTER — OFFICE VISIT (OUTPATIENT)
Age: 49
End: 2024-07-10

## 2024-07-10 DIAGNOSIS — E03.8 TSH DEFICIENCY: ICD-10-CM

## 2024-07-10 DIAGNOSIS — E11.69 TYPE 2 DIABETES MELLITUS WITH OTHER SPECIFIED COMPLICATION, WITHOUT LONG-TERM CURRENT USE OF INSULIN (HCC): ICD-10-CM

## 2024-07-10 LAB
T4 FREE SERPL-MCNC: 2.1 NG/DL (ref 0.8–1.5)
TSH SERPL DL<=0.05 MIU/L-ACNC: <0.01 UIU/ML (ref 0.36–3.74)

## 2024-07-11 LAB
THYROGLOB AB SERPL-ACNC: 34.9 IU/ML (ref 0–0.9)
THYROPEROXIDASE AB SERPL-ACNC: 54 IU/ML (ref 0–34)

## 2024-08-07 DIAGNOSIS — E07.9 THYROID DISORDER: Primary | ICD-10-CM

## 2024-08-07 RX ORDER — METHIMAZOLE 5 MG/1
5 TABLET ORAL
Qty: 30 TABLET | Refills: 1 | Status: SHIPPED | OUTPATIENT
Start: 2024-08-07 | End: 2024-10-06

## 2024-08-16 ENCOUNTER — HOSPITAL ENCOUNTER (OUTPATIENT)
Facility: HOSPITAL | Age: 49
Discharge: HOME | End: 2024-08-16
Attending: FAMILY MEDICINE
Payer: COMMERCIAL

## 2024-08-16 DIAGNOSIS — E07.9 THYROID DISORDER: ICD-10-CM

## 2024-08-16 PROCEDURE — 76536 US EXAM OF HEAD AND NECK: CPT

## 2024-08-26 ENCOUNTER — TELEPHONE (OUTPATIENT)
Age: 49
End: 2024-08-26

## 2024-08-26 NOTE — TELEPHONE ENCOUNTER
Returned call to pt, informed her that her ultrasound is still in process, pt stated understanding

## 2024-09-04 ENCOUNTER — HOSPITAL ENCOUNTER (OUTPATIENT)
Facility: HOSPITAL | Age: 49
Discharge: HOME | End: 2024-09-07
Attending: FAMILY MEDICINE
Payer: COMMERCIAL

## 2024-09-04 VITALS — WEIGHT: 134 LBS | HEIGHT: 61 IN | BODY MASS INDEX: 25.3 KG/M2

## 2024-09-04 DIAGNOSIS — Z12.31 ENCOUNTER FOR SCREENING MAMMOGRAM FOR BREAST CANCER: ICD-10-CM

## 2024-09-04 PROCEDURE — 77067 SCR MAMMO BI INCL CAD: CPT

## 2024-09-09 ENCOUNTER — OFFICE VISIT (OUTPATIENT)
Age: 49
End: 2024-09-09
Payer: COMMERCIAL

## 2024-09-09 VITALS
BODY MASS INDEX: 25.33 KG/M2 | DIASTOLIC BLOOD PRESSURE: 72 MMHG | RESPIRATION RATE: 18 BRPM | HEART RATE: 100 BPM | SYSTOLIC BLOOD PRESSURE: 133 MMHG | OXYGEN SATURATION: 98 % | TEMPERATURE: 97.8 F | WEIGHT: 134 LBS

## 2024-09-09 DIAGNOSIS — E03.8 TSH DEFICIENCY: ICD-10-CM

## 2024-09-09 DIAGNOSIS — E11.69 TYPE 2 DIABETES MELLITUS WITH OTHER SPECIFIED COMPLICATION, WITHOUT LONG-TERM CURRENT USE OF INSULIN (HCC): Primary | ICD-10-CM

## 2024-09-09 DIAGNOSIS — E11.69 TYPE 2 DIABETES MELLITUS WITH OTHER SPECIFIED COMPLICATION, WITHOUT LONG-TERM CURRENT USE OF INSULIN (HCC): ICD-10-CM

## 2024-09-09 LAB
CREAT UR-MCNC: 25.1 MG/DL
HBA1C MFR BLD: 8.1 %
MICROALBUMIN UR-MCNC: <0.5 MG/DL
MICROALBUMIN/CREAT UR-RTO: <20 MG/G (ref 0–30)
T4 FREE SERPL-MCNC: 1.9 NG/DL (ref 0.8–1.5)
TSH SERPL DL<=0.05 MIU/L-ACNC: <0.01 UIU/ML (ref 0.36–3.74)

## 2024-09-09 PROCEDURE — 3078F DIAST BP <80 MM HG: CPT | Performed by: FAMILY MEDICINE

## 2024-09-09 PROCEDURE — 83036 HEMOGLOBIN GLYCOSYLATED A1C: CPT | Performed by: FAMILY MEDICINE

## 2024-09-09 PROCEDURE — 3075F SYST BP GE 130 - 139MM HG: CPT | Performed by: FAMILY MEDICINE

## 2024-09-09 PROCEDURE — 3052F HG A1C>EQUAL 8.0%<EQUAL 9.0%: CPT | Performed by: FAMILY MEDICINE

## 2024-09-09 PROCEDURE — 99213 OFFICE O/P EST LOW 20 MIN: CPT | Performed by: FAMILY MEDICINE

## 2024-09-09 ASSESSMENT — PATIENT HEALTH QUESTIONNAIRE - PHQ9
2. FEELING DOWN, DEPRESSED OR HOPELESS: NOT AT ALL
SUM OF ALL RESPONSES TO PHQ QUESTIONS 1-9: 0
SUM OF ALL RESPONSES TO PHQ9 QUESTIONS 1 & 2: 0
SUM OF ALL RESPONSES TO PHQ QUESTIONS 1-9: 0
1. LITTLE INTEREST OR PLEASURE IN DOING THINGS: NOT AT ALL

## 2024-09-10 DIAGNOSIS — E05.90 HYPERTHYROIDISM: Primary | ICD-10-CM

## 2024-09-10 LAB
T3 SERPL-MCNC: 188 NG/DL (ref 71–180)
T3FREE SERPL-MCNC: 5.2 PG/ML (ref 2–4.4)

## 2024-09-10 RX ORDER — METHIMAZOLE 5 MG/1
5 TABLET ORAL 3 TIMES DAILY
Qty: 30 TABLET | Refills: 4 | Status: SHIPPED | OUTPATIENT
Start: 2024-09-10 | End: 2024-10-10

## 2025-01-09 DIAGNOSIS — I10 ESSENTIAL (PRIMARY) HYPERTENSION: ICD-10-CM

## 2025-01-09 DIAGNOSIS — E11.69 TYPE 2 DIABETES MELLITUS WITH OTHER SPECIFIED COMPLICATION (HCC): ICD-10-CM

## 2025-01-09 NOTE — TELEPHONE ENCOUNTER
Last appointment: 9/9/24  Next appointment: none  Previous refill encounter(s): 6/21/24 #60 with 5 refills    Requested Prescriptions     Pending Prescriptions Disp Refills    sitaGLIPtan-metFORMIN (JANUMET)  MG per tablet [Pharmacy Med Name: Janumet  MG Oral Tablet (SITagliptin-metFORMIN)] 180 tablet 1     Sig: Take 1 tablet by mouth 2 times daily (with meals)         For Pharmacy Admin Tracking Only    Program: Medication Refill  CPA in place:    Recommendation Provided To:   Intervention Detail: New Rx: 1, reason: Patient Preference  Intervention Accepted By:   Gap Closed?:    Time Spent (min): 5

## 2025-01-12 RX ORDER — ATORVASTATIN CALCIUM 10 MG/1
10 TABLET, FILM COATED ORAL DAILY
Qty: 90 TABLET | Refills: 2 | Status: SHIPPED | OUTPATIENT
Start: 2025-01-12

## 2025-01-12 RX ORDER — SITAGLIPTIN AND METFORMIN HYDROCHLORIDE 1000; 50 MG/1; MG/1
1 TABLET, FILM COATED ORAL 2 TIMES DAILY WITH MEALS
Qty: 180 TABLET | Refills: 1 | Status: SHIPPED | OUTPATIENT
Start: 2025-01-12

## 2025-01-12 RX ORDER — AMLODIPINE AND BENAZEPRIL HYDROCHLORIDE 5; 40 MG/1; MG/1
CAPSULE ORAL
Qty: 90 CAPSULE | Refills: 2 | Status: SHIPPED | OUTPATIENT
Start: 2025-01-12

## 2025-02-05 ENCOUNTER — TELEPHONE (OUTPATIENT)
Age: 50
End: 2025-02-05

## 2025-02-05 NOTE — TELEPHONE ENCOUNTER
Pt has had a cough for the past week and a half and would like to know what she can take otc to help her. She can be reached at [phone].

## 2025-03-13 ENCOUNTER — OFFICE VISIT (OUTPATIENT)
Age: 50
End: 2025-03-13
Payer: COMMERCIAL

## 2025-03-13 VITALS
WEIGHT: 133 LBS | HEART RATE: 77 BPM | RESPIRATION RATE: 16 BRPM | BODY MASS INDEX: 25.14 KG/M2 | DIASTOLIC BLOOD PRESSURE: 82 MMHG | OXYGEN SATURATION: 99 % | SYSTOLIC BLOOD PRESSURE: 128 MMHG | TEMPERATURE: 97.4 F

## 2025-03-13 DIAGNOSIS — E78.00 HIGH CHOLESTEROL: ICD-10-CM

## 2025-03-13 DIAGNOSIS — E03.8 TSH DEFICIENCY: ICD-10-CM

## 2025-03-13 DIAGNOSIS — Z11.1 SCREENING FOR TUBERCULOSIS: ICD-10-CM

## 2025-03-13 DIAGNOSIS — D50.8 IRON DEFICIENCY ANEMIA SECONDARY TO INADEQUATE DIETARY IRON INTAKE: ICD-10-CM

## 2025-03-13 DIAGNOSIS — I10 PRIMARY HYPERTENSION: ICD-10-CM

## 2025-03-13 DIAGNOSIS — E11.69 TYPE 2 DIABETES MELLITUS WITH OTHER SPECIFIED COMPLICATION, UNSPECIFIED WHETHER LONG TERM INSULIN USE (HCC): ICD-10-CM

## 2025-03-13 DIAGNOSIS — E11.69 TYPE 2 DIABETES MELLITUS WITH OTHER SPECIFIED COMPLICATION, UNSPECIFIED WHETHER LONG TERM INSULIN USE (HCC): Primary | ICD-10-CM

## 2025-03-13 LAB
ALBUMIN SERPL-MCNC: 4.3 G/DL (ref 3.5–5)
ALBUMIN/GLOB SERPL: 1.2 (ref 1.1–2.2)
ALP SERPL-CCNC: 123 U/L (ref 45–117)
ALT SERPL-CCNC: 31 U/L (ref 12–78)
ANION GAP SERPL CALC-SCNC: 8 MMOL/L (ref 2–12)
APPEARANCE UR: CLEAR
AST SERPL-CCNC: 4 U/L (ref 15–37)
BACTERIA URNS QL MICRO: NEGATIVE /HPF
BILIRUB SERPL-MCNC: 0.5 MG/DL (ref 0.2–1)
BILIRUB UR QL: NEGATIVE
BUN SERPL-MCNC: 8 MG/DL (ref 6–20)
BUN/CREAT SERPL: 12 (ref 12–20)
CALCIUM SERPL-MCNC: 10.1 MG/DL (ref 8.5–10.1)
CHLORIDE SERPL-SCNC: 106 MMOL/L (ref 97–108)
CHOLEST SERPL-MCNC: 169 MG/DL
CO2 SERPL-SCNC: 26 MMOL/L (ref 21–32)
COLOR UR: ABNORMAL
COMMENT:: NORMAL
CREAT SERPL-MCNC: 0.66 MG/DL (ref 0.55–1.02)
CREAT UR-MCNC: <13 MG/DL
EPITH CASTS URNS QL MICRO: ABNORMAL /LPF
ERYTHROCYTE [DISTWIDTH] IN BLOOD BY AUTOMATED COUNT: 11.4 % (ref 11.5–14.5)
FERRITIN SERPL-MCNC: 32 NG/ML (ref 26–388)
GLOBULIN SER CALC-MCNC: 3.6 G/DL (ref 2–4)
GLUCOSE SERPL-MCNC: 194 MG/DL (ref 65–100)
GLUCOSE UR STRIP.AUTO-MCNC: 500 MG/DL
HBA1C MFR BLD: 8.1 %
HCT VFR BLD AUTO: 46.3 % (ref 35–47)
HDLC SERPL-MCNC: 54 MG/DL
HDLC SERPL: 3.1 (ref 0–5)
HGB BLD-MCNC: 16 G/DL (ref 11.5–16)
HGB UR QL STRIP: NEGATIVE
HYALINE CASTS URNS QL MICRO: ABNORMAL /LPF (ref 0–5)
IRON SATN MFR SERPL: 26 % (ref 20–50)
IRON SERPL-MCNC: 97 UG/DL (ref 35–150)
KETONES UR QL STRIP.AUTO: NEGATIVE MG/DL
LDLC SERPL CALC-MCNC: 86 MG/DL (ref 0–100)
LEUKOCYTE ESTERASE UR QL STRIP.AUTO: NEGATIVE
MCH RBC QN AUTO: 31.6 PG (ref 26–34)
MCHC RBC AUTO-ENTMCNC: 34.6 G/DL (ref 30–36.5)
MCV RBC AUTO: 91.3 FL (ref 80–99)
MICROALBUMIN UR-MCNC: <0.5 MG/DL
MICROALBUMIN/CREAT UR-RTO: <38 MG/G (ref 0–30)
NITRITE UR QL STRIP.AUTO: NEGATIVE
NRBC # BLD: 0 K/UL (ref 0–0.01)
NRBC BLD-RTO: 0 PER 100 WBC
PH UR STRIP: 7 (ref 5–8)
PLATELET # BLD AUTO: 394 K/UL (ref 150–400)
PMV BLD AUTO: 10.7 FL (ref 8.9–12.9)
POTASSIUM SERPL-SCNC: 4.1 MMOL/L (ref 3.5–5.1)
PROT SERPL-MCNC: 7.9 G/DL (ref 6.4–8.2)
PROT UR STRIP-MCNC: NEGATIVE MG/DL
RBC # BLD AUTO: 5.07 M/UL (ref 3.8–5.2)
RBC #/AREA URNS HPF: ABNORMAL /HPF (ref 0–5)
SODIUM SERPL-SCNC: 140 MMOL/L (ref 136–145)
SP GR UR REFRACTOMETRY: 1.01 (ref 1–1.03)
SPECIMEN HOLD: NORMAL
T4 FREE SERPL-MCNC: 1.9 NG/DL (ref 0.8–1.5)
TIBC SERPL-MCNC: 377 UG/DL (ref 250–450)
TRIGL SERPL-MCNC: 145 MG/DL
TSH SERPL DL<=0.05 MIU/L-ACNC: <0.01 UIU/ML (ref 0.36–3.74)
URINE CULTURE IF INDICATED: ABNORMAL
UROBILINOGEN UR QL STRIP.AUTO: 0.2 EU/DL (ref 0.2–1)
VLDLC SERPL CALC-MCNC: 29 MG/DL
WBC # BLD AUTO: 7 K/UL (ref 3.6–11)
WBC URNS QL MICRO: ABNORMAL /HPF (ref 0–4)

## 2025-03-13 PROCEDURE — 83036 HEMOGLOBIN GLYCOSYLATED A1C: CPT | Performed by: FAMILY MEDICINE

## 2025-03-13 PROCEDURE — 3074F SYST BP LT 130 MM HG: CPT | Performed by: FAMILY MEDICINE

## 2025-03-13 PROCEDURE — 3052F HG A1C>EQUAL 8.0%<EQUAL 9.0%: CPT | Performed by: FAMILY MEDICINE

## 2025-03-13 PROCEDURE — 99214 OFFICE O/P EST MOD 30 MIN: CPT | Performed by: FAMILY MEDICINE

## 2025-03-13 PROCEDURE — 3079F DIAST BP 80-89 MM HG: CPT | Performed by: FAMILY MEDICINE

## 2025-03-13 SDOH — ECONOMIC STABILITY: FOOD INSECURITY: WITHIN THE PAST 12 MONTHS, THE FOOD YOU BOUGHT JUST DIDN'T LAST AND YOU DIDN'T HAVE MONEY TO GET MORE.: NEVER TRUE

## 2025-03-13 SDOH — ECONOMIC STABILITY: FOOD INSECURITY: WITHIN THE PAST 12 MONTHS, YOU WORRIED THAT YOUR FOOD WOULD RUN OUT BEFORE YOU GOT MONEY TO BUY MORE.: NEVER TRUE

## 2025-03-13 ASSESSMENT — PATIENT HEALTH QUESTIONNAIRE - PHQ9
SUM OF ALL RESPONSES TO PHQ QUESTIONS 1-9: 0
1. LITTLE INTEREST OR PLEASURE IN DOING THINGS: NOT AT ALL
2. FEELING DOWN, DEPRESSED OR HOPELESS: NOT AT ALL
SUM OF ALL RESPONSES TO PHQ QUESTIONS 1-9: 0

## 2025-03-13 NOTE — PROGRESS NOTES
Chief Complaint   Patient presents with    Diabetes     Follow up    Hypertension     Follow up        \"Have you been to the ER, urgent care clinic since your last visit?  Hospitalized since your last visit?\"    NO    “Have you seen or consulted any other health care providers outside of [facility] since your last visit?”    NO     “Have you had a pap smear?”    NO    No cervical cancer screening on file         “Have you had a colorectal cancer screening such as a colonoscopy/FIT/Cologuard?    NO    No colonoscopy on file  No cologuard on file  No FIT/FOBT on file   No flexible sigmoidoscopy on file         Click Here for Release of Records Request     No results found for this visit on 25.   Vitals:    25 0828   BP: 128/82   Pulse: 77   Resp: 16   Temp: 97.4 °F (36.3 °C)   TempSrc: Infrared   SpO2: 99%   Weight: 60.3 kg (133 lb)          The patient, Martha Whiting, identity was verified by name and .

## 2025-03-19 ENCOUNTER — RESULTS FOLLOW-UP (OUTPATIENT)
Age: 50
End: 2025-03-19

## 2025-03-19 RX ORDER — FERROUS SULFATE 7.5 MG/0.5
15 SYRINGE (EA) ORAL 3 TIMES DAILY
Qty: 150 ML | Refills: 4 | Status: SHIPPED | OUTPATIENT
Start: 2025-03-19

## 2025-03-19 NOTE — ASSESSMENT & PLAN NOTE
Chronic, not at goal (unstable), continue current treatment plan, medication adherence emphasized, and lifestyle modifications recommended    Orders:    AMB POC HEMOGLOBIN A1C    Barrie Daley MD, GastroenterologyJosh ([facility])    Albumin/Creatinine Ratio, Urine; Future    CBC; Future    Comprehensive Metabolic Panel; Future    Iron and TIBC; Future    Ferritin; Future    TSH + Free T4 Panel; Future    Urinalysis with Reflex to Culture; Future    Lipid Panel; Future

## 2025-03-19 NOTE — ASSESSMENT & PLAN NOTE
Chronic, at goal (stable), continue current treatment plan and medication adherence emphasized    Orders:    Barrie Daley MD, GastroenterologyJosh ([facility])    Albumin/Creatinine Ratio, Urine; Future    CBC; Future    Comprehensive Metabolic Panel; Future    Iron and TIBC; Future    Ferritin; Future    TSH + Free T4 Panel; Future    Urinalysis with Reflex to Culture; Future    Lipid Panel; Future

## 2025-03-19 NOTE — ASSESSMENT & PLAN NOTE
Chronic, at goal (stable), continue current treatment plan and medication adherence emphasized    Orders:    Barrie Daley MD, GastroenterologyJosh ([facility])    Albumin/Creatinine Ratio, Urine; Future    CBC; Future    Comprehensive Metabolic Panel; Future    Iron and TIBC; Future    Ferritin; Future    TSH + Free T4 Panel; Future    Urinalysis with Reflex to Culture; Future    Lipid Panel; Future

## 2025-03-19 NOTE — PROGRESS NOTES
Martha Whiting (:  1975) is a 49 y.o. female,Established patient, here for evaluation of the following chief complaint(s):  Diabetes (Follow up) and Hypertension (Follow up )    HTN   pt also present for Bp check , compliant w/ the bp meds, a low salt diet, an  active life style, patient does obtain the bp at home and the average of , today the pt denies Chest Pain, has no legs swelling no lightheadedness,the pat has not been feeling anxious, and  Has not been feeling stressed out, otherwise feeling better since the last visit      DMtype II  Patient also present for diabetic check,  the patient has been compliancy w/ a diabetic diet, and eat less of carbohydrates, since last visit patient has been more active with daily walking, patient also states that there is no home monitoring glucose device    This time patient denies  tingling sensation, has no polyurea and polydipsia, last a1c was at target of 8.4% %       Last urine microalbumin  and was  normal, patient currently on ACE inhibitor.  Feeling better since the last visit.    In addition patient has history of hypercholesterolemia has been compliant with the statin therapy denies any muscle ache, currently taking the medication nightly last lipid panel was reviewed and was normal ++++refills needed at this time has been trying to have a low-fat low-cholesterol diet sometimes likes the fast foods weight has been stable    Constitutional: no fever, nl energy levels, nl sleep patterns, nl appetite, and nl weight fluctuations,  - exercise habits,  nad     HENT: no ear pain or nosebleeds. No blurred vision  Respiratory: no shortness of breath, wheezing cough   Cardiovascular: Has no chest pain, ,and racing heart .   Gastrointestinal: No constipation, diarrhea, nausea and vomiting.   Genitourinary: No frequency.   Musculoskeletal: Negative for joint pain.   Skin: no itching, no rash.   Neurological: Negative for dizziness, no

## 2025-06-26 ENCOUNTER — OFFICE VISIT (OUTPATIENT)
Age: 50
End: 2025-06-26
Payer: COMMERCIAL

## 2025-06-26 VITALS
TEMPERATURE: 97.8 F | HEIGHT: 61 IN | SYSTOLIC BLOOD PRESSURE: 115 MMHG | RESPIRATION RATE: 19 BRPM | WEIGHT: 132 LBS | DIASTOLIC BLOOD PRESSURE: 77 MMHG | HEART RATE: 81 BPM | OXYGEN SATURATION: 98 % | BODY MASS INDEX: 24.92 KG/M2

## 2025-06-26 DIAGNOSIS — I10 PRIMARY HYPERTENSION: ICD-10-CM

## 2025-06-26 DIAGNOSIS — Z23 ENCOUNTER FOR IMMUNIZATION: ICD-10-CM

## 2025-06-26 DIAGNOSIS — Z71.89 ACP (ADVANCE CARE PLANNING): ICD-10-CM

## 2025-06-26 DIAGNOSIS — E11.69 TYPE 2 DIABETES MELLITUS WITH OTHER SPECIFIED COMPLICATION, WITHOUT LONG-TERM CURRENT USE OF INSULIN (HCC): ICD-10-CM

## 2025-06-26 DIAGNOSIS — Z12.11 SCREENING FOR MALIGNANT NEOPLASM OF COLON: ICD-10-CM

## 2025-06-26 DIAGNOSIS — Z00.00 ENCOUNTER FOR WELL ADULT EXAM WITHOUT ABNORMAL FINDINGS: Primary | ICD-10-CM

## 2025-06-26 DIAGNOSIS — E78.00 HIGH CHOLESTEROL: ICD-10-CM

## 2025-06-26 DIAGNOSIS — E61.1 IRON DEFICIENCY: ICD-10-CM

## 2025-06-26 LAB
ALBUMIN SERPL-MCNC: 4.1 G/DL (ref 3.5–5)
ALBUMIN/GLOB SERPL: 1.2 (ref 1.1–2.2)
ALP SERPL-CCNC: 119 U/L (ref 45–117)
ALT SERPL-CCNC: 44 U/L (ref 12–78)
ANION GAP SERPL CALC-SCNC: 6 MMOL/L (ref 2–12)
AST SERPL-CCNC: 19 U/L (ref 15–37)
BASOPHILS # BLD: 0.07 K/UL (ref 0–0.1)
BASOPHILS NFR BLD: 1.1 % (ref 0–1)
BILIRUB SERPL-MCNC: 0.6 MG/DL (ref 0.2–1)
BUN SERPL-MCNC: 6 MG/DL (ref 6–20)
BUN/CREAT SERPL: 10 (ref 12–20)
CALCIUM SERPL-MCNC: 9.9 MG/DL (ref 8.5–10.1)
CHLORIDE SERPL-SCNC: 106 MMOL/L (ref 97–108)
CHOLEST SERPL-MCNC: 144 MG/DL
CO2 SERPL-SCNC: 27 MMOL/L (ref 21–32)
CREAT SERPL-MCNC: 0.62 MG/DL (ref 0.55–1.02)
DIFFERENTIAL METHOD BLD: ABNORMAL
EOSINOPHIL # BLD: 0.19 K/UL (ref 0–0.4)
EOSINOPHIL NFR BLD: 3 % (ref 0–7)
ERYTHROCYTE [DISTWIDTH] IN BLOOD BY AUTOMATED COUNT: 11.5 % (ref 11.5–14.5)
EST. AVERAGE GLUCOSE BLD GHB EST-MCNC: 169 MG/DL
FERRITIN SERPL-MCNC: 29 NG/ML (ref 8–252)
GLOBULIN SER CALC-MCNC: 3.4 G/DL (ref 2–4)
GLUCOSE SERPL-MCNC: 202 MG/DL (ref 65–100)
HBA1C MFR BLD: 7.5 % (ref 4–5.6)
HCT VFR BLD AUTO: 47 % (ref 35–47)
HDLC SERPL-MCNC: 51 MG/DL
HDLC SERPL: 2.8 (ref 0–5)
HGB BLD-MCNC: 15.2 G/DL (ref 11.5–16)
IMM GRANULOCYTES # BLD AUTO: 0.03 K/UL (ref 0–0.04)
IMM GRANULOCYTES NFR BLD AUTO: 0.5 % (ref 0–0.5)
IRON SATN MFR SERPL: 27 % (ref 20–50)
IRON SERPL-MCNC: 103 UG/DL (ref 35–150)
LDLC SERPL CALC-MCNC: 71.4 MG/DL (ref 0–100)
LYMPHOCYTES # BLD: 2.31 K/UL (ref 0.8–3.5)
LYMPHOCYTES NFR BLD: 36 % (ref 12–49)
MCH RBC QN AUTO: 31.7 PG (ref 26–34)
MCHC RBC AUTO-ENTMCNC: 32.3 G/DL (ref 30–36.5)
MCV RBC AUTO: 97.9 FL (ref 80–99)
MONOCYTES # BLD: 0.53 K/UL (ref 0–1)
MONOCYTES NFR BLD: 8.3 % (ref 5–13)
NEUTS SEG # BLD: 3.29 K/UL (ref 1.8–8)
NEUTS SEG NFR BLD: 51.1 % (ref 32–75)
NRBC # BLD: 0 K/UL (ref 0–0.01)
NRBC BLD-RTO: 0 PER 100 WBC
PLATELET # BLD AUTO: 380 K/UL (ref 150–400)
PMV BLD AUTO: 10.9 FL (ref 8.9–12.9)
POTASSIUM SERPL-SCNC: 4.2 MMOL/L (ref 3.5–5.1)
PROT SERPL-MCNC: 7.5 G/DL (ref 6.4–8.2)
RBC # BLD AUTO: 4.8 M/UL (ref 3.8–5.2)
SODIUM SERPL-SCNC: 139 MMOL/L (ref 136–145)
T4 FREE SERPL-MCNC: 1.9 NG/DL (ref 0.8–1.5)
TIBC SERPL-MCNC: 376 UG/DL (ref 250–450)
TRIGL SERPL-MCNC: 108 MG/DL
TSH SERPL DL<=0.05 MIU/L-ACNC: <0.01 UIU/ML (ref 0.36–3.74)
VLDLC SERPL CALC-MCNC: 21.6 MG/DL
WBC # BLD AUTO: 6.4 K/UL (ref 3.6–11)

## 2025-06-26 PROCEDURE — 99497 ADVNCD CARE PLAN 30 MIN: CPT | Performed by: FAMILY MEDICINE

## 2025-06-26 PROCEDURE — 99396 PREV VISIT EST AGE 40-64: CPT | Performed by: FAMILY MEDICINE

## 2025-06-26 PROCEDURE — 90715 TDAP VACCINE 7 YRS/> IM: CPT | Performed by: FAMILY MEDICINE

## 2025-06-26 PROCEDURE — 3078F DIAST BP <80 MM HG: CPT | Performed by: FAMILY MEDICINE

## 2025-06-26 PROCEDURE — 90471 IMMUNIZATION ADMIN: CPT | Performed by: FAMILY MEDICINE

## 2025-06-26 PROCEDURE — 3074F SYST BP LT 130 MM HG: CPT | Performed by: FAMILY MEDICINE

## 2025-06-26 ASSESSMENT — PATIENT HEALTH QUESTIONNAIRE - PHQ9
SUM OF ALL RESPONSES TO PHQ QUESTIONS 1-9: 0
SUM OF ALL RESPONSES TO PHQ QUESTIONS 1-9: 0
1. LITTLE INTEREST OR PLEASURE IN DOING THINGS: NOT AT ALL
SUM OF ALL RESPONSES TO PHQ QUESTIONS 1-9: 0
2. FEELING DOWN, DEPRESSED OR HOPELESS: NOT AT ALL
SUM OF ALL RESPONSES TO PHQ QUESTIONS 1-9: 0

## 2025-06-26 NOTE — PROGRESS NOTES
Chief Complaint   Patient presents with    Annual Exam       \"Have you been to the ER, urgent care clinic since your last visit?  Hospitalized since your last visit?\"    NO    “Have you seen or consulted any other health care providers outside of [facility] since your last visit?”    NO     “Have you had a pap smear?”    NO    No cervical cancer screening on file         “Have you had a colorectal cancer screening such as a colonoscopy/FIT/Cologuard?    NO    No colonoscopy on file  No cologuard on file  No FIT/FOBT on file   No flexible sigmoidoscopy on file       Click Here for Release of Records Request     No results found for this visit on 25.   Vitals:    25 0810   BP: 115/77   Pulse: 81   Resp: 19   Temp: 97.8 °F (36.6 °C)   TempSrc: Temporal   SpO2: 98%   Weight: 59.9 kg (132 lb)   Height: 1.549 m (5' 0.98\")          The patient, Martha Whiting, identity was verified by name and .

## 2025-06-26 NOTE — PROGRESS NOTES
Per verbal orders of Dr. Harper , injection of TDAP was given in the Left deltoid . Patient tolerated it well. Patient instructed to report any adverse reaction to me immediately.

## 2025-06-26 NOTE — PROGRESS NOTES
Well Adult Note  Name: Martha Whiting Today’s Date: 2025   MRN: [id number] Sex: Female   Age: 49 y.o. Ethnicity: Non- / Non    : 1975 Race: Other       Martha Whiting is here for a well adult exam.       Assessment & Plan   Encounter for well adult exam without abnormal findings  Type 2 diabetes mellitus with other specified complication, without long-term current use of insulin (HCC)  -     Lipid Panel; Future  -     Hemoglobin A1C; Future  -     CBC with Auto Differential; Future  -     Comprehensive Metabolic Panel; Future  -     TSH + Free T4 Panel; Future  -     Diabetic Foot Exam  Primary hypertension  -     Lipid Panel; Future  -     Hemoglobin A1C; Future  -     CBC with Auto Differential; Future  -     Comprehensive Metabolic Panel; Future  -     TSH + Free T4 Panel; Future  -     Diabetic Foot Exam  -     T3 Total and Free ([facility] Default)  High cholesterol  -     Lipid Panel; Future  -     Hemoglobin A1C; Future  -     CBC with Auto Differential; Future  -     Comprehensive Metabolic Panel; Future  -     TSH + Free T4 Panel; Future  -     Diabetic Foot Exam  Screening for malignant neoplasm of colon  -     [facility] - Barrie Irene MD, G[facility], [place] ([address] )  Encounter for immunization  -     Tdap, BOOSTRIX, (age 10 yrs+), IM  Iron deficiency  -     Ferritin; Future  -     Iron and TIBC; Future  -     T3 Total and Free ([facility] Default)  ACP (advance care planning)  -     [facility] Advanced Care Planning (16-30 minutes) [49883]  -     Full code        Return if symptoms worsen or fail to improve.       Subjective   History:    Present for CPE,   patient is not currently Up todate w/ all vaccination, last tetanus vaccine was in almost 10 years offered      Pt stating that she had a normal Pap smear last year , she goes to OB/Gyn,     Pt never had a colonoscopy, and has no fhx of colon cancer in addition pt never had a mammog and has had no fhx of breast

## 2025-06-29 ENCOUNTER — RESULTS FOLLOW-UP (OUTPATIENT)
Age: 50
End: 2025-06-29

## 2025-07-15 RX ORDER — SITAGLIPTIN AND METFORMIN HYDROCHLORIDE 1000; 50 MG/1; MG/1
1 TABLET, FILM COATED ORAL 2 TIMES DAILY WITH MEALS
Qty: 180 TABLET | Refills: 1 | Status: SHIPPED | OUTPATIENT
Start: 2025-07-15

## 2025-07-15 NOTE — TELEPHONE ENCOUNTER
Last appointment: 6/26/25  Next appointment: 10/28/25  Previous refill encounter(s): 1/12/25 #180 with 1 refill    Requested Prescriptions     Pending Prescriptions Disp Refills    JANUMET  MG per tablet [Pharmacy Med Name: Janumet  MG Oral Tablet (SITagliptin-metFORMIN)] 180 tablet 1     Sig: Take 1 tablet by mouth 2 times daily (with meals)         For Pharmacy Admin Tracking Only    Program: Medication Refill  CPA in place:    Recommendation Provided To:   Intervention Detail: New Rx: 1, reason: Patient Preference  Intervention Accepted By:   Gap Closed?:    Time Spent (min): 5

## 2025-08-01 ENCOUNTER — OFFICE VISIT (OUTPATIENT)
Age: 50
End: 2025-08-01
Payer: COMMERCIAL

## 2025-08-01 VITALS
SYSTOLIC BLOOD PRESSURE: 118 MMHG | HEIGHT: 61 IN | WEIGHT: 135.4 LBS | HEART RATE: 78 BPM | BODY MASS INDEX: 25.57 KG/M2 | DIASTOLIC BLOOD PRESSURE: 74 MMHG

## 2025-08-01 DIAGNOSIS — E05.90 HYPERTHYROIDISM: Primary | ICD-10-CM

## 2025-08-01 DIAGNOSIS — E04.2 MULTINODULAR GOITER: ICD-10-CM

## 2025-08-01 PROCEDURE — 3078F DIAST BP <80 MM HG: CPT | Performed by: INTERNAL MEDICINE

## 2025-08-01 PROCEDURE — 3074F SYST BP LT 130 MM HG: CPT | Performed by: INTERNAL MEDICINE

## 2025-08-01 PROCEDURE — 99205 OFFICE O/P NEW HI 60 MIN: CPT | Performed by: INTERNAL MEDICINE

## 2025-08-01 RX ORDER — MULTIVITAMIN WITH IRON
1 TABLET ORAL DAILY
COMMUNITY

## 2025-08-01 NOTE — PROGRESS NOTES
Chief Complaint   Patient presents with    New Patient    Thyroid Problem     Pcp and pharmacy verified     History of Present Illness: Martha Whiting is a 49 y.o. female who I was asked to see in consult by Dr. Hesham Harper for evaluation of hyperthyroidism and multinodular goiter.    \"I was told I had thyroid issues but I was not told exactly what was going on.\"  Pt reports she was first told she had thyroid issues in August 2024. \"I had labs as part of my annual physical and my labs were off. I did an US and they found a bit of growth.\"    Prior to July 2024 she was never told she had thyroid issues.    \"Dr. Harper did prescribe Methimazole, but I did not start it yet.\"    Pt has no prior hx of cancers.  Pt was born in [facility].  No known exposures to ionizing radiation.    She notes she will get occasional palpitations \"when I feel too hot I get racing heart issues.\"   She denies issues of of CP, SOB, tremors, diarrhea, or constipation.  She denies issues of dysphagia, or dysphonia.  She denies any swelling, LAD or growth in the neck.    Her LMP was 7/21/25, she notes her cycle is normal in timing and duration.    No known family hx of thyroid issues.    Her mother has endometrial cancer.    Past Medical History:   Diagnosis Date    Diabetes (HCC)     High cholesterol     HTN (hypertension)     Type 2 diabetes mellitus without complication (HCC) May 2014     History reviewed. No pertinent surgical history.  Current Outpatient Medications   Medication Sig    Multiple Vitamin (MULTIVITAMIN) TABS tablet Take 1 tablet by mouth daily    JANUMET  MG per tablet Take 1 tablet by mouth 2 times daily (with meals)    ferrous sulfate (CAMILO-IN-SOL) 75 (15 Fe) MG/ML solution Take 1 mL by mouth 3 times daily    amLODIPine-benazepril (LOTREL) 5-40 MG per capsule Take 1 capsule by mouth daily.    atorvastatin (LIPITOR) 10 MG tablet Take 1 tablet by mouth daily    blood glucose test strips (ASCENSIA AUTODISC

## 2025-08-02 LAB — TSI ACT/NOR SER: 3.41 IU/L (ref 0–0.55)

## 2025-08-03 LAB
T3 SERPL-MCNC: 141 NG/DL (ref 71–180)
THYROGLOB AB SERPL-ACNC: 155.1 IU/ML (ref 0–0.9)
THYROPEROXIDASE AB SERPL-ACNC: 153 IU/ML (ref 0–34)
TSH RECEP AB SER-ACNC: 4.45 IU/L (ref 0–1.75)

## 2025-08-13 ENCOUNTER — HOSPITAL ENCOUNTER (OUTPATIENT)
Facility: HOSPITAL | Age: 50
Discharge: HOME | End: 2025-08-16
Attending: INTERNAL MEDICINE
Payer: COMMERCIAL

## 2025-08-13 DIAGNOSIS — E04.2 MULTINODULAR GOITER: ICD-10-CM

## 2025-08-13 DIAGNOSIS — E05.90 HYPERTHYROIDISM: ICD-10-CM

## 2025-08-13 PROCEDURE — A9516 IODINE I-123 SOD IODIDE MIC: HCPCS | Performed by: INTERNAL MEDICINE

## 2025-08-13 PROCEDURE — 78014 THYROID IMAGING W/BLOOD FLOW: CPT

## 2025-08-13 PROCEDURE — 3430000000 HC RX DIAGNOSTIC RADIOPHARMACEUTICAL: Performed by: INTERNAL MEDICINE

## 2025-08-13 RX ORDER — SODIUM IODIDE I 123 200 UCI/1
400 CAPSULE, GELATIN COATED ORAL ONCE
Status: COMPLETED | OUTPATIENT
Start: 2025-08-13 | End: 2025-08-13

## 2025-08-13 RX ADMIN — SODIUM IODIDE I 123 302 MICRO CURIE: 200 CAPSULE, GELATIN COATED ORAL at 09:00

## 2025-08-14 ENCOUNTER — HOSPITAL ENCOUNTER (OUTPATIENT)
Facility: HOSPITAL | Age: 50
Discharge: HOME | End: 2025-08-17
Attending: INTERNAL MEDICINE
Payer: COMMERCIAL

## 2025-08-14 DIAGNOSIS — E04.2 MULTINODULAR GOITER: ICD-10-CM

## 2025-08-14 PROCEDURE — 76536 US EXAM OF HEAD AND NECK: CPT

## 2025-09-03 DIAGNOSIS — E05.90 HYPERTHYROIDISM: Primary | ICD-10-CM

## 2025-09-03 RX ORDER — METHIMAZOLE 5 MG/1
5 TABLET ORAL DAILY
Qty: 90 TABLET | Refills: 1 | Status: SHIPPED | OUTPATIENT
Start: 2025-09-03

## 2025-09-05 ENCOUNTER — HOSPITAL ENCOUNTER (OUTPATIENT)
Facility: HOSPITAL | Age: 50
Discharge: HOME | End: 2025-09-05
Payer: COMMERCIAL

## 2025-09-05 DIAGNOSIS — Z12.31 SCREENING MAMMOGRAM FOR BREAST CANCER: ICD-10-CM

## 2025-09-05 PROCEDURE — 77067 SCR MAMMO BI INCL CAD: CPT
